# Patient Record
Sex: MALE | Race: WHITE
[De-identification: names, ages, dates, MRNs, and addresses within clinical notes are randomized per-mention and may not be internally consistent; named-entity substitution may affect disease eponyms.]

---

## 2024-09-10 ENCOUNTER — APPOINTMENT (OUTPATIENT)
Dept: ORTHOPEDIC SURGERY | Facility: CLINIC | Age: 24
End: 2024-09-10
Payer: COMMERCIAL

## 2024-09-10 VITALS — HEIGHT: 70 IN | BODY MASS INDEX: 25.77 KG/M2 | WEIGHT: 180 LBS

## 2024-09-10 DIAGNOSIS — Z78.9 OTHER SPECIFIED HEALTH STATUS: ICD-10-CM

## 2024-09-10 DIAGNOSIS — Z00.00 ENCOUNTER FOR GENERAL ADULT MEDICAL EXAMINATION W/OUT ABNORMAL FINDINGS: ICD-10-CM

## 2024-09-10 DIAGNOSIS — S90.31XA CONTUSION OF RIGHT FOOT, INITIAL ENCOUNTER: ICD-10-CM

## 2024-09-10 PROCEDURE — 73630 X-RAY EXAM OF FOOT: CPT | Mod: RT

## 2024-09-10 PROCEDURE — 73650 X-RAY EXAM OF HEEL: CPT | Mod: RT

## 2024-09-10 PROCEDURE — 99203 OFFICE O/P NEW LOW 30 MIN: CPT

## 2024-09-10 NOTE — HISTORY OF PRESENT ILLNESS
[Dull/Aching] : dull/aching [Sharp] : sharp [Throbbing] : throbbing [8] : 8 [3] : 3 [Standing] : standing [Walking] : walking [de-identified] : 09/10/2024:  acute pain 2 days ago from stepping on base playing softball. no tx to date. no prior foot probs. denies dm/tob. part time at [employer] [] : no [FreeTextEntry1] : right foot

## 2024-09-10 NOTE — ASSESSMENT
[FreeTextEntry1] : wbat ice/elevate nsaids prn requesting PT rx for gait - intoeing that does not cause pain or limitation (advised can see hip provider in future if desired) gel heel pad; otc compression sleeve f/up 3-4 wks if not resolved

## 2024-09-10 NOTE — PHYSICAL EXAM
[Right] : right foot and ankle [Mild] : mild swelling of lateral foot [NL (40)] : plantar flexion 40 degrees [NL 30)] : inversion 30 degrees [NL (20)] : eversion 20 degrees [5___] : eversion 5[unfilled]/5 [] : no achilles tendon tenderness [FreeTextEntry3] : ecchymosis at heel [FreeTextEntry8] : distal calc ttp [de-identified] : normal stanley [TWNoteComboBox7] : dorsiflexion 15 degrees